# Patient Record
Sex: MALE | Race: WHITE | Employment: FULL TIME | URBAN - METROPOLITAN AREA
[De-identification: names, ages, dates, MRNs, and addresses within clinical notes are randomized per-mention and may not be internally consistent; named-entity substitution may affect disease eponyms.]

---

## 2018-04-20 ENCOUNTER — OFFICE VISIT (OUTPATIENT)
Dept: PRIMARY CARE CLINIC | Age: 33
End: 2018-04-20
Payer: COMMERCIAL

## 2018-04-20 ENCOUNTER — HOSPITAL ENCOUNTER (OUTPATIENT)
Age: 33
Discharge: HOME | End: 2018-04-22
Payer: COMMERCIAL

## 2018-04-20 VITALS
OXYGEN SATURATION: 98 % | WEIGHT: 190 LBS | TEMPERATURE: 97.5 F | BODY MASS INDEX: 27.2 KG/M2 | HEART RATE: 80 BPM | HEIGHT: 70 IN

## 2018-04-20 DIAGNOSIS — Z79.1 NSAID LONG-TERM USE: ICD-10-CM

## 2018-04-20 DIAGNOSIS — M54.6 CHRONIC MIDLINE THORACIC BACK PAIN: ICD-10-CM

## 2018-04-20 DIAGNOSIS — G89.29 CHRONIC MIDLINE THORACIC BACK PAIN: ICD-10-CM

## 2018-04-20 LAB
ANION GAP SERPL CALCULATED.3IONS-SCNC: 17 MMOL/L (ref 7–16)
BUN BLDV-MCNC: 20 MG/DL (ref 6–20)
CALCIUM SERPL-MCNC: 9.6 MG/DL (ref 8.6–10.2)
CHLORIDE BLD-SCNC: 102 MMOL/L (ref 98–107)
CO2: 25 MMOL/L (ref 22–29)
CREAT SERPL-MCNC: 1 MG/DL (ref 0.7–1.2)
GFR AFRICAN AMERICAN: >60
GFR NON-AFRICAN AMERICAN: >60 ML/MIN/1.73
GLUCOSE BLD-MCNC: 77 MG/DL (ref 74–109)
HCT VFR BLD CALC: 43.6 % (ref 37–54)
HEMOGLOBIN: 14.6 G/DL (ref 12.5–16.5)
MCH RBC QN AUTO: 29 PG (ref 26–35)
MCHC RBC AUTO-ENTMCNC: 33.5 % (ref 32–34.5)
MCV RBC AUTO: 86.5 FL (ref 80–99.9)
PDW BLD-RTO: 12.5 FL (ref 11.5–15)
PLATELET # BLD: 272 E9/L (ref 130–450)
PMV BLD AUTO: 10.8 FL (ref 7–12)
POTASSIUM SERPL-SCNC: 3.9 MMOL/L (ref 3.5–5)
RBC # BLD: 5.04 E12/L (ref 3.8–5.8)
SODIUM BLD-SCNC: 144 MMOL/L (ref 132–146)
WBC # BLD: 6.8 E9/L (ref 4.5–11.5)

## 2018-04-20 PROCEDURE — 99203 OFFICE O/P NEW LOW 30 MIN: CPT | Performed by: INTERNAL MEDICINE

## 2018-04-20 PROCEDURE — 80048 BASIC METABOLIC PNL TOTAL CA: CPT

## 2018-04-20 PROCEDURE — 85027 COMPLETE CBC AUTOMATED: CPT

## 2018-04-20 RX ORDER — TIZANIDINE 4 MG/1
2 TABLET ORAL EVERY 6 HOURS PRN
Qty: 21 TABLET | Refills: 0 | Status: SHIPPED | OUTPATIENT
Start: 2018-04-20 | End: 2019-12-07

## 2018-04-20 ASSESSMENT — PATIENT HEALTH QUESTIONNAIRE - PHQ9
SUM OF ALL RESPONSES TO PHQ9 QUESTIONS 1 & 2: 0
1. LITTLE INTEREST OR PLEASURE IN DOING THINGS: 0
SUM OF ALL RESPONSES TO PHQ QUESTIONS 1-9: 0
2. FEELING DOWN, DEPRESSED OR HOPELESS: 0

## 2018-06-01 ENCOUNTER — OFFICE VISIT (OUTPATIENT)
Dept: PRIMARY CARE CLINIC | Age: 33
End: 2018-06-01
Payer: COMMERCIAL

## 2018-06-01 VITALS
OXYGEN SATURATION: 97 % | WEIGHT: 195 LBS | BODY MASS INDEX: 27.98 KG/M2 | TEMPERATURE: 98.2 F | DIASTOLIC BLOOD PRESSURE: 78 MMHG | SYSTOLIC BLOOD PRESSURE: 122 MMHG | HEART RATE: 91 BPM

## 2018-06-01 DIAGNOSIS — M54.6 CHRONIC MIDLINE THORACIC BACK PAIN: Primary | ICD-10-CM

## 2018-06-01 DIAGNOSIS — G89.29 CHRONIC MIDLINE THORACIC BACK PAIN: Primary | ICD-10-CM

## 2018-06-01 PROCEDURE — 99213 OFFICE O/P EST LOW 20 MIN: CPT | Performed by: INTERNAL MEDICINE

## 2019-12-07 ENCOUNTER — APPOINTMENT (OUTPATIENT)
Dept: GENERAL RADIOLOGY | Age: 34
End: 2019-12-07
Payer: COMMERCIAL

## 2019-12-07 ENCOUNTER — HOSPITAL ENCOUNTER (EMERGENCY)
Age: 34
Discharge: HOME | End: 2019-12-07
Attending: EMERGENCY MEDICINE
Payer: COMMERCIAL

## 2019-12-07 VITALS
WEIGHT: 185 LBS | SYSTOLIC BLOOD PRESSURE: 132 MMHG | OXYGEN SATURATION: 98 % | BODY MASS INDEX: 26.48 KG/M2 | RESPIRATION RATE: 16 BRPM | DIASTOLIC BLOOD PRESSURE: 84 MMHG | HEART RATE: 90 BPM | HEIGHT: 70 IN | TEMPERATURE: 98.5 F

## 2019-12-07 DIAGNOSIS — S09.90XA CLOSED HEAD INJURY, INITIAL ENCOUNTER: Primary | ICD-10-CM

## 2019-12-07 DIAGNOSIS — S30.0XXA CONTUSION OF SACRUM, INITIAL ENCOUNTER: ICD-10-CM

## 2019-12-07 PROCEDURE — 99284 EMERGENCY DEPT VISIT MOD MDM: CPT

## 2019-12-07 PROCEDURE — 72110 X-RAY EXAM L-2 SPINE 4/>VWS: CPT

## 2019-12-07 ASSESSMENT — PAIN DESCRIPTION - PAIN TYPE: TYPE: ACUTE PAIN

## 2019-12-07 ASSESSMENT — PAIN SCALES - GENERAL: PAINLEVEL_OUTOF10: 6

## 2019-12-07 ASSESSMENT — PAIN DESCRIPTION - ONSET: ONSET: GRADUAL

## 2019-12-07 ASSESSMENT — PAIN DESCRIPTION - ORIENTATION: ORIENTATION: LEFT

## 2019-12-07 ASSESSMENT — PAIN DESCRIPTION - FREQUENCY: FREQUENCY: CONTINUOUS

## 2019-12-07 ASSESSMENT — PAIN DESCRIPTION - PROGRESSION: CLINICAL_PROGRESSION: NOT CHANGED

## 2019-12-07 ASSESSMENT — PAIN DESCRIPTION - LOCATION: LOCATION: BACK

## 2019-12-07 ASSESSMENT — PAIN DESCRIPTION - DESCRIPTORS: DESCRIPTORS: ACHING;DISCOMFORT;SORE

## 2020-10-09 ENCOUNTER — APPOINTMENT (OUTPATIENT)
Dept: GENERAL RADIOLOGY | Age: 35
End: 2020-10-09
Payer: COMMERCIAL

## 2020-10-09 ENCOUNTER — HOSPITAL ENCOUNTER (EMERGENCY)
Age: 35
Discharge: HOME | End: 2020-10-09
Payer: COMMERCIAL

## 2020-10-09 VITALS
HEIGHT: 70 IN | HEART RATE: 62 BPM | BODY MASS INDEX: 26.48 KG/M2 | OXYGEN SATURATION: 97 % | TEMPERATURE: 96.8 F | WEIGHT: 185 LBS | RESPIRATION RATE: 16 BRPM | SYSTOLIC BLOOD PRESSURE: 104 MMHG | DIASTOLIC BLOOD PRESSURE: 70 MMHG

## 2020-10-09 PROCEDURE — 99283 EMERGENCY DEPT VISIT LOW MDM: CPT

## 2020-10-09 PROCEDURE — 99284 EMERGENCY DEPT VISIT MOD MDM: CPT

## 2020-10-09 PROCEDURE — 6370000000 HC RX 637 (ALT 250 FOR IP): Performed by: NURSE PRACTITIONER

## 2020-10-09 PROCEDURE — 73610 X-RAY EXAM OF ANKLE: CPT

## 2020-10-09 PROCEDURE — 29515 APPLICATION SHORT LEG SPLINT: CPT

## 2020-10-09 RX ORDER — OXYCODONE HYDROCHLORIDE AND ACETAMINOPHEN 5; 325 MG/1; MG/1
1 TABLET ORAL ONCE
Status: COMPLETED | OUTPATIENT
Start: 2020-10-09 | End: 2020-10-09

## 2020-10-09 RX ORDER — OXYCODONE HYDROCHLORIDE AND ACETAMINOPHEN 5; 325 MG/1; MG/1
1 TABLET ORAL EVERY 6 HOURS PRN
Qty: 20 TABLET | Refills: 0 | Status: ON HOLD | OUTPATIENT
Start: 2020-10-09 | End: 2020-10-12 | Stop reason: HOSPADM

## 2020-10-09 RX ADMIN — OXYCODONE HYDROCHLORIDE AND ACETAMINOPHEN 1 TABLET: 5; 325 TABLET ORAL at 22:34

## 2020-10-09 ASSESSMENT — PAIN DESCRIPTION - PAIN TYPE: TYPE: ACUTE PAIN

## 2020-10-09 ASSESSMENT — PAIN DESCRIPTION - PROGRESSION: CLINICAL_PROGRESSION: GRADUALLY IMPROVING

## 2020-10-09 ASSESSMENT — PAIN SCALES - GENERAL
PAINLEVEL_OUTOF10: 6
PAINLEVEL_OUTOF10: 4

## 2020-10-10 NOTE — ED PROVIDER NOTES
[facility]     Department of Emergency Medicine   ED  Provider Note  Admit Date/RoomTime: 10/9/2020  9:51 PM  ED Room: 29/29  Chief Complaint:   Ankle Pain (L ankle pain)    History of Present Illness   Source of history provided by:  patient and spouse/SO. History/Exam Limitations: none. Wilber Romero is a 28 y.o. old male presenting to the emergency department by private vehicle, for Left ankle pain which occured 2 hour(s) prior to arrival.  Cause of complaint: Tripped on a step while at home. There has been a history of no prior problems with this area in the past.  Since onset the symptoms have been moderate in degree with ability to bear weight, but with some pain. His pain is aggraveated by walking and relieved by nothing. Tetanus Status: unknown. ROS    Pertinent positives and negatives are stated within HPI, all other systems reviewed and are negative. History reviewed. No pertinent surgical history. Social History:  reports that he has never smoked. His smokeless tobacco use includes chew. Family History: family history is not on file. Allergies: Patient has no known allergies. Physical Exam           ED Triage Vitals   BP Temp Temp src Pulse Resp SpO2 Height Weight   10/09/20 2107 10/09/20 2104 -- 10/09/20 2107 10/09/20 2108 10/09/20 2107 10/09/20 2107 10/09/20 2104   111/69 96.8 °F (36 °C)  57 16 99 % 5' 10\" (1.778 m) 185 lb (83.9 kg)       Oxygen Saturation Interpretation: Normal.    Constitutional:  Alert, development consistent with age. Neck:  Normal ROM. Supple. Ankle:  Left Lateral:              Tenderness:  moderate. Swelling: Moderate. Deformity: no.             ROM: diminished range with pain. Skin:  no erythema, rash or wounds noted. Neurovascular: Motor deficit: none. Sensory deficit:   none. Pulse deficit: none.               Capillary refill: normal.  Knee: Tenderness:  none. Swelling: None. Deformity: no.             ROM: full range of motion. Skin:  no erythema, rash or wounds noted. Foot:              Tenderness:  none. Swelling: None. Deformity: no.             ROM: full range of motion. Skin:  no erythema, rash or wounds noted. Gait:  limp. Lymphatics: No lymphangitis or adenopathy noted. Neurological:  Oriented. Motor functions intact. Lab / Imaging Results   (All laboratory and radiology results have been personally reviewed by myself)  Labs:  No results found for this visit on 10/09/20. Imaging: All Radiology results interpreted by Radiologist unless otherwise noted. XR ANKLE LEFT (MIN 3 VIEWS)   Final Result   Acutely fractured lateral malleolus. ED Course / Medical Decision Making     Medications   oxyCODONE-acetaminophen (PERCOCET) 5-325 MG per tablet 1 tablet (1 tablet Oral Given 10/9/20 2234)      2245: Patient resting in no distress. Consults:   None    Procedure(s):   Short leg and sugar tong splint applied by nursing staff. MDM:      X-ray is positive for a lateral malleolus fracture. He is placed in a posterior and sugar tong splint. He is given crutches and medicated for pain. Plan is subsequently for symptom control, limited use and  immobilization with appropriate outpatient follow-up. He is instructed to return to emergency department with any new or worsening symptoms. Counseling: The emergency provider has spoken with the patient and spouse/SO and discussed todays results, in addition to providing specific details for the plan of care and counseling regarding the diagnosis and prognosis. Questions are answered at this time and they are agreeable with the plan. Assessment      1.  Closed displaced fracture of lateral malleolus of left fibula, initial encounter      Plan   Discharge to home  Patient condition is good    New Medications     New Prescriptions    OXYCODONE-ACETAMINOPHEN (PERCOCET) 5-325 MG PER TABLET    Take 1 tablet by mouth every 6 hours as needed for Pain for up to 5 days. Intended supply: 3 days. Take lowest dose possible to manage pain     Electronically signed by AGATHA Jean CNP   DD: 10/9/20  **This report was transcribed using voice recognition software. Every effort was made to ensure accuracy; however, inadvertent computerized transcription errors may be present.   END OF ED PROVIDER NOTE       Kalee Ferraro, AGATHA Ventura CNP  10/09/20 [id number]

## 2020-10-11 NOTE — H&P
Department of Orthopedic Surgery  Attending History and Physical        CHIEF COMPLAINT:  Left ankle pain    Reason for Admission:  Surgical repair left ankle fracture. History Obtained From: patient, electronic medical record    HISTORY OF PRESENT ILLNESS:      The patient is a 28 y.o. male with no significant past medical history who presents with acute left ankle pain after a slip and fall at home 3 days ago, was unable to [facility] seen in ED xrays disclose a displaced fracture of the lateral malleolus. No other joint complaints, does note previous history of ankle sprains. Past Medical History:    No past medical history on file. Past Surgical History:    No past surgical history on file. Immunizations:              Influenza:  Not indicated            Pneumococcal Polysaccharide:  Not indicated    Medications Prior to Admission:   No current facility-administered medications for this encounter. Current Outpatient Medications:     oxyCODONE-acetaminophen (PERCOCET) 5-325 MG per tablet, Take 1 tablet by mouth every 6 hours as needed for Pain for up to 5 days. Intended supply: 3 days. Take lowest dose possible to manage pain, Disp: 20 tablet, Rfl: 0      Allergies:  Patient has no known allergies. Social History:   TOBACCO:   reports that he has never smoked. His smokeless tobacco use includes chew. ETOH:   has no history on file for alcohol. Patient currently lives in a private home with spouse and children, employed in construction as an . Family History:   No family history on file. REVIEW OF SYSTEMS:  Constitutional: Negative for chills, diaphoresis, fatigue, fever and unexpected weight change. Respiratory: Negative for cough, shortness of breath and wheezing. Cardiovascular: Negative for chest pain and palpitations. Neurological: Negative for dizziness, syncope, weakness and numbness.   Musculoskeletal: see HPI     PHYSICAL EXAM:  VITALS:  There were no vitals taken for this visit.  CONSTITUTIONAL:  In a private home with spouse and 2 small children. NECK:  supple, symmetrical, trachea midline  LUNGS:  clear  CARDIOVASCULAR:  Regular rate and rhythm  MUSCULOSKELETAL:  Left ankle swollen medial and lateral, small abrasion medial halllux, no blisters, tender to palpation medial and lateral.    NEUROLOGIC:  Grossly intact motor and sensation  Mental Status Exam:  Level of Alertness:   awake  Orientation:   person, place, time    DATA:  CBC with Differential:    Lab Results   Component Value Date    WBC 6.8 04/20/2018    RBC 5.04 04/20/2018    HGB 14.6 04/20/2018    HCT 43.6 04/20/2018     04/20/2018    MCV 86.5 04/20/2018    MCH 29.0 04/20/2018    MCHC 33.5 04/20/2018    RDW 12.5 04/20/2018       Radiographs:  Multiple view xrays of the left ankle taken in the ED following injury disclose a displaced fracture of the lateral malleolus and lateral shift of the talus / asymmetry of the ankle mortise. ASSESSMENT AND PLAN:    Displaced / unstable fracture left ankle. Treatment alternatives were reviewed both operative and closed, the expected risks benefits and probable outcomes of each were discussed, questions asked answered and understood - at the patient's request will proceed with ORIF left ankle anticipating OP procedure.

## 2020-10-12 ENCOUNTER — HOSPITAL ENCOUNTER (OUTPATIENT)
Age: 35
Setting detail: OUTPATIENT SURGERY
Discharge: HOME | End: 2020-10-12
Attending: ORTHOPAEDIC SURGERY | Admitting: ORTHOPAEDIC SURGERY
Payer: COMMERCIAL

## 2020-10-12 ENCOUNTER — ANESTHESIA EVENT (OUTPATIENT)
Dept: OPERATING ROOM | Age: 35
End: 2020-10-12
Payer: COMMERCIAL

## 2020-10-12 ENCOUNTER — APPOINTMENT (OUTPATIENT)
Dept: GENERAL RADIOLOGY | Age: 35
End: 2020-10-12
Attending: ORTHOPAEDIC SURGERY
Payer: COMMERCIAL

## 2020-10-12 ENCOUNTER — ANESTHESIA (OUTPATIENT)
Dept: OPERATING ROOM | Age: 35
End: 2020-10-12
Payer: COMMERCIAL

## 2020-10-12 VITALS
TEMPERATURE: 98.4 F | SYSTOLIC BLOOD PRESSURE: 112 MMHG | DIASTOLIC BLOOD PRESSURE: 54 MMHG | OXYGEN SATURATION: 100 % | RESPIRATION RATE: 8 BRPM

## 2020-10-12 VITALS
OXYGEN SATURATION: 95 % | TEMPERATURE: 97.3 F | BODY MASS INDEX: 26.2 KG/M2 | HEART RATE: 75 BPM | WEIGHT: 183 LBS | SYSTOLIC BLOOD PRESSURE: 128 MMHG | HEIGHT: 70 IN | DIASTOLIC BLOOD PRESSURE: 70 MMHG | RESPIRATION RATE: 14 BRPM

## 2020-10-12 LAB — SARS-COV-2, NAAT: NOT DETECTED

## 2020-10-12 PROCEDURE — 3209999900 FLUORO FOR SURGICAL PROCEDURES

## 2020-10-12 PROCEDURE — 7100000001 HC PACU RECOVERY - ADDTL 15 MIN: Performed by: ORTHOPAEDIC SURGERY

## 2020-10-12 PROCEDURE — 6360000002 HC RX W HCPCS: Performed by: NURSE ANESTHETIST, CERTIFIED REGISTERED

## 2020-10-12 PROCEDURE — 7100000000 HC PACU RECOVERY - FIRST 15 MIN: Performed by: ORTHOPAEDIC SURGERY

## 2020-10-12 PROCEDURE — 6370000000 HC RX 637 (ALT 250 FOR IP): Performed by: ANESTHESIOLOGY

## 2020-10-12 PROCEDURE — 3600000004 HC SURGERY LEVEL 4 BASE: Performed by: ORTHOPAEDIC SURGERY

## 2020-10-12 PROCEDURE — 3700000000 HC ANESTHESIA ATTENDED CARE: Performed by: ORTHOPAEDIC SURGERY

## 2020-10-12 PROCEDURE — 2720000010 HC SURG SUPPLY STERILE: Performed by: ORTHOPAEDIC SURGERY

## 2020-10-12 PROCEDURE — 2580000003 HC RX 258: Performed by: NURSE ANESTHETIST, CERTIFIED REGISTERED

## 2020-10-12 PROCEDURE — 3700000001 HC ADD 15 MINUTES (ANESTHESIA): Performed by: ORTHOPAEDIC SURGERY

## 2020-10-12 PROCEDURE — 2500000003 HC RX 250 WO HCPCS: Performed by: NURSE ANESTHETIST, CERTIFIED REGISTERED

## 2020-10-12 PROCEDURE — 6360000002 HC RX W HCPCS: Performed by: ORTHOPAEDIC SURGERY

## 2020-10-12 PROCEDURE — 6360000002 HC RX W HCPCS

## 2020-10-12 PROCEDURE — 7100000011 HC PHASE II RECOVERY - ADDTL 15 MIN: Performed by: ORTHOPAEDIC SURGERY

## 2020-10-12 PROCEDURE — 7100000010 HC PHASE II RECOVERY - FIRST 15 MIN: Performed by: ORTHOPAEDIC SURGERY

## 2020-10-12 PROCEDURE — 3600000014 HC SURGERY LEVEL 4 ADDTL 15MIN: Performed by: ORTHOPAEDIC SURGERY

## 2020-10-12 PROCEDURE — 2709999900 HC NON-CHARGEABLE SUPPLY: Performed by: ORTHOPAEDIC SURGERY

## 2020-10-12 PROCEDURE — 27792 TREATMENT OF ANKLE FRACTURE: CPT | Performed by: ORTHOPAEDIC SURGERY

## 2020-10-12 PROCEDURE — 6360000002 HC RX W HCPCS: Performed by: ANESTHESIOLOGY

## 2020-10-12 PROCEDURE — U0002 COVID-19 LAB TEST NON-CDC: HCPCS

## 2020-10-12 PROCEDURE — C1713 ANCHOR/SCREW BN/BN,TIS/BN: HCPCS | Performed by: ORTHOPAEDIC SURGERY

## 2020-10-12 DEVICE — SCREW CORTX SLFTP FTHRD 3.5X18MM: Type: IMPLANTABLE DEVICE | Site: ANKLE | Status: FUNCTIONAL

## 2020-10-12 DEVICE — PLATE BNE L57MM 5 H S STL 1/3 TBLR LOK COMPR W/ CLLR FOR: Type: IMPLANTABLE DEVICE | Site: ANKLE | Status: FUNCTIONAL

## 2020-10-12 DEVICE — SCREW BNE L16MM DIA3.5MM CORT S STL ST NONCANNULATED LOK: Type: IMPLANTABLE DEVICE | Site: ANKLE | Status: FUNCTIONAL

## 2020-10-12 RX ORDER — OXYCODONE HYDROCHLORIDE AND ACETAMINOPHEN 5; 325 MG/1; MG/1
1 TABLET ORAL PRN
Status: COMPLETED | OUTPATIENT
Start: 2020-10-12 | End: 2020-10-12

## 2020-10-12 RX ORDER — FENTANYL CITRATE 50 UG/ML
INJECTION, SOLUTION INTRAMUSCULAR; INTRAVENOUS PRN
Status: DISCONTINUED | OUTPATIENT
Start: 2020-10-12 | End: 2020-10-12 | Stop reason: SDUPTHER

## 2020-10-12 RX ORDER — LIDOCAINE HYDROCHLORIDE 20 MG/ML
INJECTION, SOLUTION EPIDURAL; INFILTRATION; INTRACAUDAL; PERINEURAL PRN
Status: DISCONTINUED | OUTPATIENT
Start: 2020-10-12 | End: 2020-10-12 | Stop reason: SDUPTHER

## 2020-10-12 RX ORDER — MIDAZOLAM HYDROCHLORIDE 1 MG/ML
INJECTION INTRAMUSCULAR; INTRAVENOUS PRN
Status: DISCONTINUED | OUTPATIENT
Start: 2020-10-12 | End: 2020-10-12 | Stop reason: SDUPTHER

## 2020-10-12 RX ORDER — OXYCODONE HYDROCHLORIDE AND ACETAMINOPHEN 5; 325 MG/1; MG/1
1 TABLET ORAL EVERY 6 HOURS PRN
Qty: 28 TABLET | Refills: 0 | Status: SHIPPED | OUTPATIENT
Start: 2020-10-12 | End: 2020-10-19

## 2020-10-12 RX ORDER — DIPHENHYDRAMINE HYDROCHLORIDE 50 MG/ML
12.5 INJECTION INTRAMUSCULAR; INTRAVENOUS
Status: DISCONTINUED | OUTPATIENT
Start: 2020-10-12 | End: 2020-10-12 | Stop reason: HOSPADM

## 2020-10-12 RX ORDER — ONDANSETRON 2 MG/ML
INJECTION INTRAMUSCULAR; INTRAVENOUS PRN
Status: DISCONTINUED | OUTPATIENT
Start: 2020-10-12 | End: 2020-10-12 | Stop reason: SDUPTHER

## 2020-10-12 RX ORDER — OXYCODONE HYDROCHLORIDE AND ACETAMINOPHEN 5; 325 MG/1; MG/1
2 TABLET ORAL PRN
Status: COMPLETED | OUTPATIENT
Start: 2020-10-12 | End: 2020-10-12

## 2020-10-12 RX ORDER — SODIUM CHLORIDE 9 MG/ML
INJECTION, SOLUTION INTRAVENOUS CONTINUOUS PRN
Status: DISCONTINUED | OUTPATIENT
Start: 2020-10-12 | End: 2020-10-12 | Stop reason: SDUPTHER

## 2020-10-12 RX ORDER — MEPERIDINE HYDROCHLORIDE 25 MG/ML
12.5 INJECTION INTRAMUSCULAR; INTRAVENOUS; SUBCUTANEOUS EVERY 5 MIN PRN
Status: DISCONTINUED | OUTPATIENT
Start: 2020-10-12 | End: 2020-10-12 | Stop reason: HOSPADM

## 2020-10-12 RX ORDER — SODIUM CHLORIDE 0.9 % (FLUSH) 0.9 %
10 SYRINGE (ML) INJECTION PRN
Status: DISCONTINUED | OUTPATIENT
Start: 2020-10-12 | End: 2020-10-12 | Stop reason: HOSPADM

## 2020-10-12 RX ORDER — DEXAMETHASONE SODIUM PHOSPHATE 4 MG/ML
INJECTION, SOLUTION INTRA-ARTICULAR; INTRALESIONAL; INTRAMUSCULAR; INTRAVENOUS; SOFT TISSUE PRN
Status: DISCONTINUED | OUTPATIENT
Start: 2020-10-12 | End: 2020-10-12 | Stop reason: SDUPTHER

## 2020-10-12 RX ORDER — PROPOFOL 10 MG/ML
INJECTION, EMULSION INTRAVENOUS PRN
Status: DISCONTINUED | OUTPATIENT
Start: 2020-10-12 | End: 2020-10-12 | Stop reason: SDUPTHER

## 2020-10-12 RX ORDER — SODIUM CHLORIDE 0.9 % (FLUSH) 0.9 %
10 SYRINGE (ML) INJECTION EVERY 12 HOURS SCHEDULED
Status: DISCONTINUED | OUTPATIENT
Start: 2020-10-12 | End: 2020-10-12 | Stop reason: HOSPADM

## 2020-10-12 RX ADMIN — LIDOCAINE HYDROCHLORIDE 100 MG: 20 INJECTION, SOLUTION EPIDURAL; INFILTRATION; INTRACAUDAL; PERINEURAL at 08:39

## 2020-10-12 RX ADMIN — ONDANSETRON 4 MG: 2 INJECTION INTRAMUSCULAR; INTRAVENOUS at 08:50

## 2020-10-12 RX ADMIN — FENTANYL CITRATE 50 MCG: 50 INJECTION, SOLUTION INTRAMUSCULAR; INTRAVENOUS at 09:07

## 2020-10-12 RX ADMIN — OXYCODONE HYDROCHLORIDE AND ACETAMINOPHEN 2 TABLET: 5; 325 TABLET ORAL at 10:49

## 2020-10-12 RX ADMIN — HYDROMORPHONE HYDROCHLORIDE 0.5 MG: 1 INJECTION, SOLUTION INTRAMUSCULAR; INTRAVENOUS; SUBCUTANEOUS at 10:10

## 2020-10-12 RX ADMIN — Medication 2 G: at 08:45

## 2020-10-12 RX ADMIN — PROPOFOL 120 MG: 10 INJECTION, EMULSION INTRAVENOUS at 08:39

## 2020-10-12 RX ADMIN — SODIUM CHLORIDE: 9 INJECTION, SOLUTION INTRAVENOUS at 07:43

## 2020-10-12 RX ADMIN — MIDAZOLAM 2 MG: 1 INJECTION INTRAMUSCULAR; INTRAVENOUS at 08:34

## 2020-10-12 RX ADMIN — DEXAMETHASONE SODIUM PHOSPHATE 8 MG: 4 INJECTION, SOLUTION INTRAMUSCULAR; INTRAVENOUS at 08:50

## 2020-10-12 RX ADMIN — HYDROMORPHONE HYDROCHLORIDE 0.5 MG: 1 INJECTION, SOLUTION INTRAMUSCULAR; INTRAVENOUS; SUBCUTANEOUS at 10:04

## 2020-10-12 RX ADMIN — FENTANYL CITRATE 100 MCG: 50 INJECTION, SOLUTION INTRAMUSCULAR; INTRAVENOUS at 08:39

## 2020-10-12 ASSESSMENT — PULMONARY FUNCTION TESTS
PIF_VALUE: 12
PIF_VALUE: 21
PIF_VALUE: 1
PIF_VALUE: 15
PIF_VALUE: 4
PIF_VALUE: 1
PIF_VALUE: 5
PIF_VALUE: 8
PIF_VALUE: 9
PIF_VALUE: 1
PIF_VALUE: 1
PIF_VALUE: 13
PIF_VALUE: 26
PIF_VALUE: 9
PIF_VALUE: 5
PIF_VALUE: 14
PIF_VALUE: 7
PIF_VALUE: 21
PIF_VALUE: 21
PIF_VALUE: 6
PIF_VALUE: 0
PIF_VALUE: 15
PIF_VALUE: 1
PIF_VALUE: 5
PIF_VALUE: 17
PIF_VALUE: 1
PIF_VALUE: 24
PIF_VALUE: 20
PIF_VALUE: 5
PIF_VALUE: 5
PIF_VALUE: 14
PIF_VALUE: 28
PIF_VALUE: 8
PIF_VALUE: 4
PIF_VALUE: 5
PIF_VALUE: 5
PIF_VALUE: 23
PIF_VALUE: 0
PIF_VALUE: 7
PIF_VALUE: 8
PIF_VALUE: 0
PIF_VALUE: 0
PIF_VALUE: 5
PIF_VALUE: 1
PIF_VALUE: 18
PIF_VALUE: 4
PIF_VALUE: 0
PIF_VALUE: 8
PIF_VALUE: 4
PIF_VALUE: 19
PIF_VALUE: 15
PIF_VALUE: 16
PIF_VALUE: 18
PIF_VALUE: 9
PIF_VALUE: 21
PIF_VALUE: 5
PIF_VALUE: 16
PIF_VALUE: 19
PIF_VALUE: 11
PIF_VALUE: 27
PIF_VALUE: 21
PIF_VALUE: 26
PIF_VALUE: 8
PIF_VALUE: 21
PIF_VALUE: 25
PIF_VALUE: 5
PIF_VALUE: 9

## 2020-10-12 ASSESSMENT — PAIN DESCRIPTION - DESCRIPTORS: DESCRIPTORS: STABBING

## 2020-10-12 ASSESSMENT — PAIN SCALES - WONG BAKER
WONGBAKER_NUMERICALRESPONSE: 0
WONGBAKER_NUMERICALRESPONSE: 6

## 2020-10-12 ASSESSMENT — PAIN - FUNCTIONAL ASSESSMENT
PAIN_FUNCTIONAL_ASSESSMENT: 0-10
PAIN_FUNCTIONAL_ASSESSMENT: 0-10

## 2020-10-12 ASSESSMENT — PAIN SCALES - GENERAL: PAINLEVEL_OUTOF10: 7

## 2020-10-12 NOTE — ANESTHESIA PRE PROCEDURE
RBC 5.04 04/20/2018    HGB 14.6 04/20/2018    HCT 43.6 04/20/2018    MCV 86.5 04/20/2018    RDW 12.5 04/20/2018     04/20/2018       CMP:   Lab Results   Component Value Date     04/20/2018    K 3.9 04/20/2018     04/20/2018    CO2 25 04/20/2018    BUN 20 04/20/2018    CREATININE 1.0 04/20/2018    GFRAA >60 04/20/2018    LABGLOM >60 04/20/2018    GLUCOSE 77 04/20/2018    CALCIUM 9.6 04/20/2018       POC Tests: No results for input(s): POCGLU, POCNA, POCK, POCCL, POCBUN, POCHEMO, POCHCT in the last 72 hours. Coags: No results found for: PROTIME, INR, APTT    HCG (If Applicable): No results found for: PREGTESTUR, PREGSERUM, HCG, HCGQUANT     ABGs: No results found for: PHART, PO2ART, BAD2LDW, MSM7GTL, BEART, P3HGQMND     Type & Screen (If Applicable):  No results found for: LABABO, LABRH    Drug/Infectious Status (If Applicable):  No results found for: HIV, HEPCAB    COVID-19 Screening (If Applicable): No results found for: COVID19      Anesthesia Evaluation  Patient summary reviewed no history of anesthetic complications:   Airway: Mallampati: I  TM distance: >3 FB   Neck ROM: full   Dental:      Comment: fillings    Pulmonary:Negative Pulmonary ROS breath sounds clear to auscultation                             Cardiovascular:Negative CV ROS            Rhythm: regular  Rate: normal           Beta Blocker:  Not on Beta Blocker         Neuro/Psych:   Negative Neuro/Psych ROS              GI/Hepatic/Renal: Neg GI/Hepatic/Renal ROS            Endo/Other: Negative Endo/Other ROS                    Abdominal:           Vascular: negative vascular ROS. Anesthesia Plan      general     ASA 1     (#5 LMA)  Induction: intravenous. MIPS: Postoperative opioids intended and Prophylactic antiemetics administered. Anesthetic plan and risks discussed with patient. Plan discussed with CRNA.                 Rosalina Meneses MD   10/12/2020

## 2020-10-12 NOTE — BRIEF OP NOTE
Brief Postoperative Note      Patient: Erica Wilkes  YOB: 1985  MRN: [id number]    Date of Procedure: 10/12/2020    Pre-Op Diagnosis: Fractured left fibula    Post-Op Diagnosis: Same       Procedure(s):  LEFT ANKLE OPEN REDUCTION INTERNAL FIXATION  +++SYNTHES+++    Surgeon(s):  Danie Nieves MD    Assistant:  * No surgical staff found *    Anesthesia: General    Estimated Blood Loss (mL): less than 50     Complications: None    Specimens:   * No specimens in log *    Implants:  * No implants in log *      Drains: * No LDAs found *    Findings: See operative dictation    Electronically signed by Jose Shane DO on 10/12/2020 at 8:27 AM

## 2020-10-12 NOTE — OP NOTE
Operative Note      Patient: Catarino Castro  YOB: 1985  MRN: [id number]    Date of Procedure: 10/12/2020    Pre-Op Diagnosis: Left ankle fracture, displaced lateral malleolus. Post-Op Diagnosis: Same       Procedure(s):  LEFT ANKLE OPEN REDUCTION INTERNAL FIXATION   Intraoperative fluoroscopy. Surgeon(s):  Kristen Tripp MD    Assistant:   Resident: Laury You DO    Anesthesia: General    Estimated Blood Loss (mL): Minimal    Complications: None    Specimens:   * No specimens in log *    Implants:  Implant Name Type Inv. Item Serial No.  Lot No. LRB No. Used Action   SCREW CORTX SLFTP FTHRD 3.5X16MM Screw/Plate/Nail/Humphrey SCREW CORTX SLFTP FTHRD 3.5X16MM  SYNTHES  Left 2 Implanted   SCREW CORTX SLFTP FTHRD 3.5X18MM Screw/Plate/Nail/Humphrey SCREW CORTX SLFTP FTHRD 3.5X18MM  SYNTHES  Left 2 Implanted   PLATE TUBE 1/3 W/ COLLR LCP SS 57MM Screw/Plate/Nail/Humphrey PLATE TUBE 1/3 W/ COLLR LCP SS 57MM  SYNTHES  Left 1 Implanted         Drains: * No LDAs found *    Findings: Displaced and unstable oblique malleolus fracture, anatomically reduced and stable fixation. Detailed Description of Procedure:   Patient was identified placed on the operating table in the supine position, administered general anesthesia. Tourniquet was placed on left thigh the left foot ankle leg and thigh were prepped and draped in the usual sterile fashion. Timeout was performed confirming patient identity anticipated procedure and appropriate prophylactic measures taken. The limb was exsanguinated with elevation and the tourniquet was inflated to 250 mmHg. A lateral incision was made over the very distal fibula to the tip of the lateral malleolus, dissection carried down with full thickness flaps anterior to the peroneal compartment, subperiosteal dissection to expose the unstable displaced oblique fracture of the distal fibula.   Fracture was cleared of hematoma and interposed periosteum, reduced and held anatomically with a tenaculum, anatomic restoration of the mortise was confirmed under fluoroscopic control, eversion and external rotation stresses were applied, fracture remained anatomically aligned without widening of the syndesmosis. An interfragmentary compression screw was placed from anterior proximal to posterior distal crossing the fracture site resulting interfragmentary compression. A 5 hole one third tubular plate was then applied to the posterior lateral aspect of the distal fibula crossing the fracture site, plate was secured imaging in multiple planes confirmed ongoing anatomic alignment with proper position of the plate. Each of the 3 proximal holes were sequentially drilled and secured with the appropriate length self-tapping bicortical screws all obtaining good bicortical purchase. Final imaging in multiple projections confirmed anatomic restoration of the ankle mortise as well as the fracture which was stable to stress testing. Wound was irrigated with saline solution, closure was performed with interrupted 2-0 Vicryl in the subcutaneous tissues followed by staples in the skin. A dry sterile compressive dressing with sugar tong splint was applied and the tourniquet was released. Patient was awakened and taken to recovery having tolerated the surgery well.     Electronically signed by Pop Mars MD on 10/12/2020 at 1:39 PM

## 2020-10-12 NOTE — ANESTHESIA POSTPROCEDURE EVALUATION
Department of Anesthesiology  Postprocedure Note    Patient: Candance Chad  MRN: [id number]  [place]: 1985  Date of evaluation: 10/12/2020  Time:  11:37 AM     Procedure Summary     Date:  10/12/20 Room / Location:  [facility] OR 05 / [facility]    Anesthesia Start:  1026 Anesthesia Stop:  0000    Procedure:  LEFT ANKLE OPEN REDUCTION INTERNAL FIXATION (Left ) Diagnosis:  (Fractured left fibula)    Surgeon:  Shannon Lemons MD Responsible Provider:  Regan Mao MD    Anesthesia Type:  general ASA Status:  1          Anesthesia Type: general    Candice Phase I: Candice Score: 10    Candice Phase II: Candice Score: 10    Last vitals: Reviewed and per EMR flowsheets.        Anesthesia Post Evaluation    Patient location during evaluation: PACU  Patient participation: complete - patient participated  Level of consciousness: awake and alert  Airway patency: patent  Nausea & Vomiting: no nausea and no vomiting  Complications: no  Cardiovascular status: hemodynamically stable  Respiratory status: acceptable  Hydration status: euvolemic

## 2020-10-20 ENCOUNTER — OFFICE VISIT (OUTPATIENT)
Dept: ORTHOPEDIC SURGERY | Age: 35
End: 2020-10-20

## 2020-10-20 VITALS — WEIGHT: 183 LBS | HEIGHT: 70 IN | TEMPERATURE: 97.9 F | BODY MASS INDEX: 26.2 KG/M2

## 2020-10-20 PROCEDURE — 99024 POSTOP FOLLOW-UP VISIT: CPT | Performed by: ORTHOPAEDIC SURGERY

## 2020-10-20 RX ORDER — OXYCODONE HYDROCHLORIDE AND ACETAMINOPHEN 5; 325 MG/1; MG/1
1 TABLET ORAL EVERY 6 HOURS PRN
COMMUNITY
End: 2021-01-26

## 2020-10-20 NOTE — PROGRESS NOTES
Surgical staples removed from left ankle. A walking boot was applied to the left lower leg. Use and care instructions were reviewed with patient.     Brace supplied by and billed for by [facility]

## 2020-10-20 NOTE — PROGRESS NOTES
Chief Complaint:   Chief Complaint   Patient presents with    Post-Op Check     ORIF Left ankle 10/12/2020. Not taking Aspirin       Ramandeep Portal is 8 days status post open reduction internal fixation of unstable displaced left ankle lateral malleolus fracture, doing very well managing pain mostly with rest and over-the-counter's, he is back to work from home, doing very well with minimal to no weightbearing using crutches. No fever chills sweats chest pain dyspnea, no numbness tingling in the toes. Allergies; medications; past medical, surgical, family, and social history; and problem list have been reviewed today and updated as indicated in this encounter seen below. Exam: Left ankle splint was removed, lateral incision is healing without erythema fluctuance or drainage, there is the expected amount of benign periarticular soft tissue swelling no effusion. Calf is soft nontender negative Homans. Sensation active motion to the toes are intact without pain. Radiographs: Three-view x-rays of the left ankle today show ongoing anatomic alignment of the ankle mortise in anatomic restoration of the lateral malleolar fracture with interfragmentary and buttress plate fixation. Christie Sotomayor was seen today for post-op check. Diagnoses and all orders for this visit:    Status post open reduction and internal fixation (ORIF) of fracture  -     XR ANKLE LEFT (MIN 3 VIEWS)  -     TX NON-PNEUM WALK BOOT PRE CST       Doing well, staples were removed and Steri-Strips were applied to the lateral ankle incision, patient was converted to a removable walking boot, he may do touchdown weightbearing for now remove the boot for nonweightbearing active range of motion exercise to tolerance, follow-up in 3 weeks for clinical and three-view x-ray exams left ankle probable advancement of weightbearing in the boot. Return in about 3 weeks (around 11/10/2020) for xray.      Current Outpatient Medications   Medication Sig Dispense Refill    oxyCODONE-acetaminophen (PERCOCET) 5-325 MG per tablet Take 1 tablet by mouth every 6 hours as needed for Pain.  aspirin 325 MG EC tablet Take 1 tablet by mouth daily for 28 days 28 tablet 0     No current facility-administered medications for this visit. Patient Active Problem List   Diagnosis    Chronic midline thoracic back pain    NSAID long-term use    Closed fracture of left ankle       History reviewed. No pertinent past medical history. Past Surgical History:   Procedure Laterality Date    ANKLE FRACTURE SURGERY Left 10/12/2020    LEFT ANKLE OPEN REDUCTION INTERNAL FIXATION performed by Shannon Lemons MD at [facility] OR       No Known Allergies    Social History     Socioeconomic History    Marital status:      Spouse name: None    Number of children: None    Years of education: None    Highest education level: None   Occupational History    None   Social Needs    Financial resource strain: None    Food insecurity     Worry: None     Inability: None    Transportation needs     Medical: None     Non-medical: None   Tobacco Use    Smoking status: Never Smoker    Smokeless tobacco: Current User     Types: Chew   Substance and Sexual Activity    Alcohol use: None    Drug use: None    Sexual activity: None   Lifestyle    Physical activity     Days per week: None     Minutes per session: None    Stress: None   Relationships    Social connections     Talks on phone: None     Gets together: None     Attends Christianity service: None     Active member of club or organization: None     Attends meetings of clubs or organizations: None     Relationship status: None    Intimate partner violence     Fear of current or ex partner: None     Emotionally abused: None     Physically abused: None     Forced sexual activity: None   Other Topics Concern    None   Social History Narrative    None       History reviewed. No pertinent family history.       Review of Systems  As follows except as previously noted in HPI:  Constitutional: Negative for chills, diaphoresis, fatigue, fever and unexpected weight change. Respiratory: Negative for cough, shortness of breath and wheezing. Cardiovascular: Negative for chest pain and palpitations. Neurological: Negative for dizziness, syncope, cephalgia. GI / : negative  Musculoskeletal: see HPI       Objective:   Physical Exam   Constitutional: Oriented to person, place, and time. and appears well-developed and well-nourished. :   Head: Normocephalic and atraumatic. Eyes: EOM are normal.   Neck: Neck supple. Cardiovascular: Normal rate and regular rhythm. Pulmonary/Chest: Effort normal. No stridor. No respiratory distress, no wheezes. Abdominal:  No abnormal distension. Neurological: Alert and oriented to person, place, and time. Skin: Skin is warm and dry. Psychiatric: Normal mood and affect.  Behavior is normal. Thought content normal.    10/20/2020  1:54 PM

## 2020-11-10 ENCOUNTER — OFFICE VISIT (OUTPATIENT)
Dept: ORTHOPEDIC SURGERY | Age: 35
End: 2020-11-10

## 2020-11-10 VITALS — WEIGHT: 183 LBS | HEIGHT: 70 IN | BODY MASS INDEX: 26.2 KG/M2 | TEMPERATURE: 97.4 F

## 2020-11-10 PROCEDURE — 99024 POSTOP FOLLOW-UP VISIT: CPT | Performed by: ORTHOPAEDIC SURGERY

## 2020-11-10 RX ORDER — IBUPROFEN 200 MG
400 TABLET ORAL EVERY 6 HOURS PRN
COMMUNITY

## 2020-11-10 NOTE — PROGRESS NOTES
Chief Complaint:   Chief Complaint   Patient presents with    Post-Op Check     FU ORIF Lt ankle fracture 10/12/2020. Pain without boot in bed, otherwise doing well       Silvia Johnson is now 4 weeks after open reduction internal fixation of left lateral last fracture, doing very well at this point, has been bearing weight in the boot without much difficulty. He is working on range of motion out of the boot, taking nothing for pain. Able to work with limitations. Allergies; medications; past medical, surgical, family, and social history; and problem list have been reviewed today and updated as indicated in this encounter seen below. Exam: Left ankle is benign, incision is healing without erythema fluctuance or drainage, alignment and stability are normal, dorsiflexion is past neutral although a bit stiff he had as expected. No motor or sensory deficits in the toes. Radiographs: Three-view x-rays left ankle show ongoing anatomic alignment of the mortise and lateral mass fracture with good position of all hardware. Elenita Woodson was seen today for post-op check. Diagnoses and all orders for this visit:    Status post open reduction and internal fixation (ORIF) of fracture  -     XR ANKLE LEFT (MIN 3 VIEWS)       Doing well, weightbearing in the boot may be advanced to tolerance, range of motion continue out of the boot and follow-up in 1 month with three-view x-rays left ankle probable advancement to stirrup type brace. Return in about 1 month (around 12/10/2020) for xray. Current Outpatient Medications   Medication Sig Dispense Refill    ibuprofen (ADVIL;MOTRIN) 200 MG tablet Take 400 mg by mouth every 6 hours as needed for Pain      oxyCODONE-acetaminophen (PERCOCET) 5-325 MG per tablet Take 1 tablet by mouth every 6 hours as needed for Pain.       aspirin 325 MG EC tablet Take 1 tablet by mouth daily for 28 days 28 tablet 0     No current facility-administered medications for this Neurological: Negative for dizziness, syncope, cephalgia. GI / : negative  Musculoskeletal: see HPI       Objective:   Physical Exam   Constitutional: Oriented to person, place, and time. and appears well-developed and well-nourished. :   Head: Normocephalic and atraumatic. Eyes: EOM are normal.   Neck: Neck supple. Cardiovascular: Normal rate and regular rhythm. Pulmonary/Chest: Effort normal. No stridor. No respiratory distress, no wheezes. Abdominal:  No abnormal distension. Neurological: Alert and oriented to person, place, and time. Skin: Skin is warm and dry. Psychiatric: Normal mood and affect.  Behavior is normal. Thought content normal.    11/10/2020  3:25 PM

## 2020-12-09 ENCOUNTER — OFFICE VISIT (OUTPATIENT)
Dept: ORTHOPEDIC SURGERY | Age: 35
End: 2020-12-09
Payer: COMMERCIAL

## 2020-12-09 VITALS — HEIGHT: 70 IN | BODY MASS INDEX: 26.2 KG/M2 | TEMPERATURE: 97.2 F | WEIGHT: 183 LBS

## 2020-12-09 PROCEDURE — 99024 POSTOP FOLLOW-UP VISIT: CPT | Performed by: ORTHOPAEDIC SURGERY

## 2020-12-09 PROCEDURE — L4350 ANKLE CONTROL ORTHO PRE OTS: HCPCS | Performed by: ORTHOPAEDIC SURGERY

## 2020-12-09 NOTE — PROGRESS NOTES
An airsport ankle brace was applied to His Left ankle(s). Use and care of the brace were reviewed with the patient. The patient denied any issues with fit or comfort of the braces  Patient instructed to call our office if there are any issues with the braces.     Brace supplied by and billed for by [facility]

## 2020-12-10 NOTE — PROGRESS NOTES
Chief Complaint:   Chief Complaint   Patient presents with    Post-Op Check     F/u exam and xray left lateral malleolus fracture. ORIF 10/12/2020. Erica Wilkes is now about 8 weeks after ORIF left lateral malleolus fracture, doing very well reports minimal to no pain, having expected degree of stiffness which is working on out of the boot. Able to work in the [address] [place] [address], has worn some weight out of the boot for short distances at home. Taking nothing for pain. Allergies; medications; past medical, surgical, family, and social history; and problem list have been reviewed today and updated as indicated in this encounter seen below. Exam: Left ankle is benign, incision is healed although there is a tiny very superficial skin breakdown consistent with a subcuticular stitch abscess, no erythema or surrounding swelling whatsoever. Ankle stable to stress medial lateral and AP, dorsiflexion only to about 10 degrees though, mild calf atrophy as expected not tender negative Homans. Radiographs: Three-view x-rays of left ankle today confirm ongoing anatomic alignment of the ankle mortise and the lateral malleolus fracture in good position without migration or loosening of hardware. Sharee Disla was seen today for post-op check. Diagnoses and all orders for this visit:    Status post open reduction and internal fixation (ORIF) of fracture  -     XR ANKLE LEFT (MIN 3 VIEWS)  -     TX ANKLE CONTROL ORTHO PRE OTS    Closed fracture of left ankle with routine healing, subsequent encounter       Doing well, he was converted to an air sport type brace he may alternate this with the boot to tolerance, continue working on range of motion I detailed some stretching exercises for him to do at home today, follow-up in 6 weeks for clinical and three-view x-ray exams left ankle probable outcome assessment. Return in 6 weeks (on 1/20/2021), or if symptoms worsen or fail to improve, for xray.      Current Outpatient Medications   Medication Sig Dispense Refill    ibuprofen (ADVIL;MOTRIN) 200 MG tablet Take 400 mg by mouth every 6 hours as needed for Pain      oxyCODONE-acetaminophen (PERCOCET) 5-325 MG per tablet Take 1 tablet by mouth every 6 hours as needed for Pain.  aspirin 325 MG EC tablet Take 1 tablet by mouth daily for 28 days 28 tablet 0     No current facility-administered medications for this visit. Patient Active Problem List   Diagnosis    Chronic midline thoracic back pain    NSAID long-term use    Closed fracture of left ankle       History reviewed. No pertinent past medical history.     Past Surgical History:   Procedure Laterality Date    ANKLE FRACTURE SURGERY Left 10/12/2020    LEFT ANKLE OPEN REDUCTION INTERNAL FIXATION performed by Indira Godinez MD at [facility]    pyloric stenosis       No Known Allergies    Social History     Socioeconomic History    Marital status:      Spouse name: None    Number of children: None    Years of education: None    Highest education level: None   Occupational History    None   Social Needs    Financial resource strain: None    Food insecurity     Worry: None     Inability: None    Transportation needs     Medical: None     Non-medical: None   Tobacco Use    Smoking status: Never Smoker    Smokeless tobacco: Current User     Types: Chew   Substance and Sexual Activity    Alcohol use: None    Drug use: None    Sexual activity: None   Lifestyle    Physical activity     Days per week: None     Minutes per session: None    Stress: None   Relationships    Social connections     Talks on phone: None     Gets together: None     Attends [employer] service: None     Active member of club or organization: None     Attends meetings of clubs or organizations: None     Relationship status: None    Intimate partner violence     Fear of current or ex partner: None     Emotionally abused: None     Physically abused: None     Forced sexual activity: None   Other Topics Concern    None   Social History Narrative    None       No family history on file. Review of Systems  As follows except as previously noted in HPI:  Constitutional: Negative for chills, diaphoresis, fatigue, fever and unexpected weight change. Respiratory: Negative for cough, shortness of breath and wheezing. Cardiovascular: Negative for chest pain and palpitations. Neurological: Negative for dizziness, syncope, cephalgia. GI / : negative  Musculoskeletal: see HPI       Objective:   Physical Exam   Constitutional: Oriented to person, place, and time. and appears well-developed and well-nourished. :   Head: Normocephalic and atraumatic. Eyes: EOM are normal.   Neck: Neck supple. Cardiovascular: Normal rate and regular rhythm. Pulmonary/Chest: Effort normal. No stridor. No respiratory distress, no wheezes. Abdominal:  No abnormal distension. Neurological: Alert and oriented to person, place, and time. Skin: Skin is warm and dry. Psychiatric: Normal mood and affect.  Behavior is normal. Thought content normal.    12/9/2020  9:08 PM

## 2021-01-26 ENCOUNTER — OFFICE VISIT (OUTPATIENT)
Dept: ORTHOPEDIC SURGERY | Age: 36
End: 2021-01-26

## 2021-01-26 VITALS — WEIGHT: 183 LBS | BODY MASS INDEX: 26.2 KG/M2 | TEMPERATURE: 97.3 F | HEIGHT: 70 IN

## 2021-01-26 DIAGNOSIS — Z87.81 STATUS POST OPEN REDUCTION AND INTERNAL FIXATION (ORIF) OF FRACTURE: Primary | ICD-10-CM

## 2021-01-26 DIAGNOSIS — Z98.890 STATUS POST OPEN REDUCTION AND INTERNAL FIXATION (ORIF) OF FRACTURE: Primary | ICD-10-CM

## 2021-01-26 PROCEDURE — 99024 POSTOP FOLLOW-UP VISIT: CPT | Performed by: ORTHOPAEDIC SURGERY

## 2021-01-26 NOTE — PROGRESS NOTES
Chief Complaint:   Chief Complaint   Patient presents with    Ankle Injury     F/u exam and xray of left lateral malleolus fracture. ORIF 10/12/2020. Ankle doing well, minimal pain. Arline Contreras is just over 3 months after open reduction internal fixation of left ankle lateral mass fracture, doing well has not been wearing the brace for the past couple of weeks, he is back to normal activities at home and work reports occasional mild stiffness but denies pain. Taking nothing for pain. Wearing regular shoes at this time. Allergies; medications; past medical, surgical, family, and social history; and problem list have been reviewed today and updated as indicated in this encounter seen below. Exam: Left ankle shows no swelling or tenderness, plate and hardware are palpable at the posterior lateral aspect of the distal fibula above the lateral malleolus but nontender, ankle is normally aligned without effusion or crepitus range of motion slightly limited from full dorsiflexion but functional.    Radiographs: Three-view x-rays of the left ankle today show restoration of the ankle mortise with anatomic alignment of the fracture and good position of hardware without any evidence for loosening or malunion. Genesis Rodriges was seen today for ankle injury. Diagnoses and all orders for this visit:    Status post open reduction and internal fixation (ORIF) of fracture  -     XR ANKLE LEFT (MIN 3 VIEWS)       Patient was advised as the integrity of his construct, continue with activities to tolerance may experience occasional pain or stiffness over the coming months but if he has anything further or beyond that he will contact us for follow-up exam.      Return if symptoms worsen or fail to improve.      Current Outpatient Medications   Medication Sig Dispense Refill    ibuprofen (ADVIL;MOTRIN) 200 MG tablet Take 400 mg by mouth every 6 hours as needed for Pain No current facility-administered medications for this visit. Patient Active Problem List   Diagnosis    Chronic midline thoracic back pain    NSAID long-term use    Closed fracture of left ankle       History reviewed. No pertinent past medical history. Past Surgical History:   Procedure Laterality Date    ANKLE FRACTURE SURGERY Left 10/12/2020    LEFT ANKLE OPEN REDUCTION INTERNAL FIXATION performed by Pito Marks MD at [facility]    pyloric stenosis       No Known Allergies    Social History     Socioeconomic History    Marital status:      Spouse name: None    Number of children: None    Years of education: None    Highest education level: None   Occupational History    None   Social Needs    Financial resource strain: None    Food insecurity     Worry: None     Inability: None    Transportation needs     Medical: None     Non-medical: None   Tobacco Use    Smoking status: Never Smoker    Smokeless tobacco: Current User     Types: Chew   Substance and Sexual Activity    Alcohol use: None    Drug use: None    Sexual activity: None   Lifestyle    Physical activity     Days per week: None     Minutes per session: None    Stress: None   Relationships    Social connections     Talks on phone: None     Gets together: None     Attends Taoist service: None     Active member of club or organization: None     Attends meetings of clubs or organizations: None     Relationship status: None    Intimate partner violence     Fear of current or ex partner: None     Emotionally abused: None     Physically abused: None     Forced sexual activity: None   Other Topics Concern    None   Social History Narrative    None       No family history on file. Review of Systems  As follows except as previously noted in HPI:  Constitutional: Negative for chills, diaphoresis, fatigue, fever and unexpected weight change. Respiratory: Negative for cough, shortness of breath and wheezing. Cardiovascular: Negative for chest pain and palpitations. Neurological: Negative for dizziness, syncope, cephalgia. GI / : negative  Musculoskeletal: see HPI       Objective:   Physical Exam   Constitutional: Oriented to person, place, and time. and appears well-developed and well-nourished. :   Head: Normocephalic and atraumatic. Eyes: EOM are normal.   Neck: Neck supple. Cardiovascular: Normal rate and regular rhythm. Pulmonary/Chest: Effort normal. No stridor. No respiratory distress, no wheezes. Abdominal:  No abnormal distension. Neurological: Alert and oriented to person, place, and time. Skin: Skin is warm and dry. Psychiatric: Normal mood and affect.  Behavior is normal. Thought content normal.    1/26/2021  3:18 PM

## (undated) DEVICE — TRAY DRILL SYSTEM 4 REUSABLE

## (undated) DEVICE — ELECTRODE PT RET AD L9FT HI MOIST COND ADH HYDRGEL CORDED

## (undated) DEVICE — BIT DRL L112MM DIA3.5MM ST QUIK CPL NONRADIOPAQUE W/O STP

## (undated) DEVICE — BRUNNS CURRETTES

## (undated) DEVICE — DRAPE C ARM W41XL74IN UNIV MOB W RUBBERBAND CLP

## (undated) DEVICE — INTENDED FOR TISSUE SEPARATION, AND OTHER PROCEDURES THAT REQUIRE A SHARP SURGICAL BLADE TO PUNCTURE OR CUT.: Brand: BARD-PARKER ® STAINLESS STEEL BLADES

## (undated) DEVICE — STOCKINETTE,DOUBLE PLY,6X48,STERILE: Brand: MEDLINE

## (undated) DEVICE — COVER HNDL LT DISP

## (undated) DEVICE — 4-PORT MANIFOLD: Brand: NEPTUNE 2

## (undated) DEVICE — GOWN,SIRUS,POLYRNF,BRTHSLV,XLN/XL,20/CS: Brand: MEDLINE

## (undated) DEVICE — SPONGE LAP W18XL18IN WHT COT 4 PLY FLD STRUNG RADPQ DISP ST

## (undated) DEVICE — PACK PROCEDURE SURG GEN CUST

## (undated) DEVICE — GLOVE ORANGE PI 7 1/2   MSG9075

## (undated) DEVICE — TOWEL,OR,DSP,ST,BLUE,STD,6/PK,12PK/CS: Brand: MEDLINE

## (undated) DEVICE — GLOVE ORANGE PI 7   MSG9070

## (undated) DEVICE — GOWN,SIRUS,POLYRNF,BRTHSLV,XL,30/CS: Brand: MEDLINE

## (undated) DEVICE — PADDING CAST W6INXL4YD COT LO LINTING WYTEX

## (undated) DEVICE — ZIMMER® STERILE DISPOSABLE TOURNIQUET CUFF WITH PLC, DUAL PORT, SINGLE BLADDER, 34 IN. (86 CM)

## (undated) DEVICE — DRESSING PETRO W3XL8IN OIL EMUL N ADH GZ KNIT IMPREG CELOS

## (undated) DEVICE — GAUZE,SPONGE,4"X4",8PLY,STRL,LF,10/TRAY: Brand: MEDLINE

## (undated) DEVICE — TUBING SUCT 12FR MAL ALUM SHFT FN CAP VENT UNIV CONN W/ OBT

## (undated) DEVICE — DRAPE,EXTREMITY,89X128,STERILE: Brand: MEDLINE

## (undated) DEVICE — GLOVE ORANGE PI 8   MSG9080

## (undated) DEVICE — SPLINT ORTH W5XL30IN PLSTR OF PARIS FAST IMMOB OF FRAC HI

## (undated) DEVICE — DOUBLE BASIN SET: Brand: MEDLINE INDUSTRIES, INC.

## (undated) DEVICE — MARKER,SKIN,WI/RULER AND LABELS: Brand: MEDLINE

## (undated) DEVICE — PADDING,UNDERCAST,COTTON, 4"X4YD STERILE: Brand: MEDLINE

## (undated) DEVICE — Device

## (undated) DEVICE — SOLUTION IV IRRIG 1000ML POUR BTL 2F7114

## (undated) DEVICE — BIT DRL L110MM DIA2.5MM ST G QUIK CPL NONRADIOPAQUE W/O STP

## (undated) DEVICE — RETRACTOR SENN

## (undated) DEVICE — SOLUTION IV IRRIG POUR BRL 0.9% SODIUM CHL 2F7124

## (undated) DEVICE — INSTRUMENT SYSTEM 4 BATTERY REUSABLE

## (undated) DEVICE — APPLICATOR PREP 26ML 0.7% IOD POVACRYLEX 74% ISO ALC ST